# Patient Record
Sex: FEMALE | Race: WHITE | NOT HISPANIC OR LATINO | Employment: FULL TIME | URBAN - METROPOLITAN AREA
[De-identification: names, ages, dates, MRNs, and addresses within clinical notes are randomized per-mention and may not be internally consistent; named-entity substitution may affect disease eponyms.]

---

## 2023-06-09 ENCOUNTER — TELEPHONE (OUTPATIENT)
Dept: PRIMARY CARE | Facility: CLINIC | Age: 53
End: 2023-06-09
Payer: COMMERCIAL

## 2023-06-09 DIAGNOSIS — Z12.31 SCREENING MAMMOGRAM, ENCOUNTER FOR: Primary | ICD-10-CM

## 2023-07-11 DIAGNOSIS — K21.9 GASTROESOPHAGEAL REFLUX DISEASE, UNSPECIFIED WHETHER ESOPHAGITIS PRESENT: ICD-10-CM

## 2023-07-11 RX ORDER — PANTOPRAZOLE SODIUM 40 MG/1
40 TABLET, DELAYED RELEASE ORAL DAILY
Qty: 90 TABLET | Refills: 0 | Status: SHIPPED | OUTPATIENT
Start: 2023-07-11 | End: 2023-12-01 | Stop reason: SDUPTHER

## 2023-07-11 RX ORDER — PANTOPRAZOLE SODIUM 40 MG/1
1 TABLET, DELAYED RELEASE ORAL DAILY
COMMUNITY
End: 2023-07-11 | Stop reason: SDUPTHER

## 2023-08-02 ENCOUNTER — OFFICE VISIT (OUTPATIENT)
Dept: PRIMARY CARE | Facility: CLINIC | Age: 53
End: 2023-08-02
Payer: COMMERCIAL

## 2023-08-02 VITALS
TEMPERATURE: 97.5 F | RESPIRATION RATE: 16 BRPM | HEIGHT: 67 IN | WEIGHT: 230.6 LBS | HEART RATE: 76 BPM | BODY MASS INDEX: 36.19 KG/M2 | SYSTOLIC BLOOD PRESSURE: 128 MMHG | DIASTOLIC BLOOD PRESSURE: 76 MMHG

## 2023-08-02 DIAGNOSIS — L30.9 DERMATITIS: ICD-10-CM

## 2023-08-02 DIAGNOSIS — Z13.220 LIPID SCREENING: ICD-10-CM

## 2023-08-02 DIAGNOSIS — Z00.00 ROUTINE GENERAL MEDICAL EXAMINATION AT A HEALTH CARE FACILITY: Primary | ICD-10-CM

## 2023-08-02 DIAGNOSIS — R73.9 HYPERGLYCEMIA: ICD-10-CM

## 2023-08-02 PROBLEM — D25.9 FIBROID, UTERINE: Status: ACTIVE | Noted: 2023-08-02

## 2023-08-02 PROBLEM — A04.8 H. PYLORI INFECTION: Status: ACTIVE | Noted: 2023-08-02

## 2023-08-02 PROBLEM — J45.909 ASTHMA (HHS-HCC): Status: ACTIVE | Noted: 2023-08-02

## 2023-08-02 PROBLEM — K21.9 GERD (GASTROESOPHAGEAL REFLUX DISEASE): Status: ACTIVE | Noted: 2023-08-02

## 2023-08-02 PROCEDURE — 99396 PREV VISIT EST AGE 40-64: CPT | Performed by: FAMILY MEDICINE

## 2023-08-02 PROCEDURE — 1036F TOBACCO NON-USER: CPT | Performed by: FAMILY MEDICINE

## 2023-08-02 RX ORDER — CLOTRIMAZOLE AND BETAMETHASONE DIPROPIONATE 10; .64 MG/G; MG/G
1 CREAM TOPICAL 2 TIMES DAILY
Qty: 45 G | Refills: 2 | Status: SHIPPED | OUTPATIENT
Start: 2023-08-02

## 2023-08-02 NOTE — PROGRESS NOTES
"Subjective   Patient ID: Glory Winters is a 52 y.o. female who presents for Annual Exam.    Well Adult Physical   Patient here for a comprehensive physical exam.The patient reports problems - last menses 4/7/2023    Do you take any herbs or supplements that were not prescribed by a doctor? yes   Are you taking calcium supplements? no   Are you taking aspirin daily? no     History:  LMP: April 2023  Last pap date               Review of Systems    Objective   /76   Pulse 76   Temp 36.4 °C (97.5 °F)   Resp 16   Ht 1.689 m (5' 6.5\")   Wt 105 kg (230 lb 9.6 oz)   BMI 36.66 kg/m²     Physical Exam  Vitals and nursing note reviewed.   Constitutional:       Appearance: Normal appearance.   HENT:      Head: Normocephalic and atraumatic.      Right Ear: Tympanic membrane, ear canal and external ear normal.      Left Ear: Tympanic membrane, ear canal and external ear normal.      Nose: Nose normal.      Mouth/Throat:      Mouth: Mucous membranes are moist.   Eyes:      Conjunctiva/sclera: Conjunctivae normal.   Neck:      Thyroid: No thyroid mass or thyromegaly.   Cardiovascular:      Rate and Rhythm: Normal rate and regular rhythm.      Pulses: Normal pulses.      Heart sounds: Normal heart sounds.   Pulmonary:      Effort: Pulmonary effort is normal.      Breath sounds: Normal breath sounds. No wheezing.   Abdominal:      General: Abdomen is flat. Bowel sounds are normal.      Palpations: Abdomen is soft. There is no mass.   Musculoskeletal:         General: Normal range of motion.      Cervical back: Neck supple.   Skin:     General: Skin is warm and dry.      Findings: No rash.   Neurological:      General: No focal deficit present.      Mental Status: She is alert and oriented to person, place, and time.   Psychiatric:         Mood and Affect: Mood normal.         Behavior: Behavior normal.         Thought Content: Thought content normal.         Judgment: Judgment normal.         Assessment/Plan "   Problem List Items Addressed This Visit    None  Visit Diagnoses       Routine general medical examination at a health care facility    -  Primary    Hyperglycemia        Relevant Orders    CBC    Comprehensive Metabolic Panel    Hemoglobin A1C    Lipid screening        Relevant Orders    Lipid Panel    Dermatitis        Relevant Medications    clotrimazole-betamethasone (Lotrisone) cream

## 2023-12-01 DIAGNOSIS — K21.9 GASTROESOPHAGEAL REFLUX DISEASE, UNSPECIFIED WHETHER ESOPHAGITIS PRESENT: Primary | ICD-10-CM

## 2023-12-01 RX ORDER — PANTOPRAZOLE SODIUM 40 MG/1
40 TABLET, DELAYED RELEASE ORAL DAILY
Qty: 90 TABLET | Refills: 3 | Status: SHIPPED | OUTPATIENT
Start: 2023-12-01

## 2023-12-02 NOTE — PROGRESS NOTES
Glory Winters female   1970 53 y.o.   [id number]      Chief Complaint  Bilateral breast pain, history of PASH    History Of Present Illness  Glory Winters is a very pleasant 53 y.o.  female [facility] by Theresa Knox for bilateral breast pain and history of PASH. She has family history of breast cancer in her maternal aunt, age 60. She denies breast surgery. She has a history of right breast core biopsy, benign PASH in 2016. She reports significant bilateral breast pain before she had spotting in 2023, since resolved.      BREAST IMAGIN/15/2023 Bilateral screening mammogram, indicates BI-RADS Category 1.     REPRODUCTIVE HISTORY: menarche age 13, , first birth age 20, did not breastfeed, OCP's x 10 years, perimenopausal, LMP 2023, heterogeneously dense tissue    FAMILY CANCER HISTORY:   Maternal Aunt: Breast cancer, age 60     Surgical History  She has a past surgical history that includes Cholecystectomy (2016); Tonsillectomy (2016); and Tubal ligation (2016).     Social History  She reports that she has quit smoking. Her smoking use included cigarettes. She has never used smokeless tobacco. She reports that she does not currently use alcohol. She reports that she does not use drugs.    Family History  Family History   Problem Relation Name Age of Onset    No Known Problems Mother      No Known Problems Father          Allergies  Estradiol-progesterone, Estrogenic substance, and Losartan    Medications  Current Outpatient Medications   Medication Instructions    clotrimazole-betamethasone (Lotrisone) cream 1 Application, Topical, 2 times daily    meloxicam (MOBIC) 15 mg, oral, Daily    pantoprazole (PROTONIX) 40 mg, oral, Daily         REVIEW OF SYSTEMS    Constitutional:  Negative for appetite change, fatigue, fever and unexpected weight change.   HENT:  Negative for ear pain, hearing loss, nosebleeds, sore throat and trouble  swallowing.    Eyes:  Negative for discharge, itching and visual disturbance.   Respiratory:  Negative for cough, chest tightness and shortness of breath.    Cardiovascular:  Negative for chest pain, palpitations and leg swelling.   Breast: as indicated in HPI  Gastrointestinal:  Negative for abdominal pain, constipation, diarrhea and nausea.   Endocrine: Negative for cold intolerance and heat intolerance.   Genitourinary:  Negative for dysuria, frequency, hematuria, pelvic pain and vaginal bleeding.   Musculoskeletal:  Negative for arthralgias, back pain, gait problem, joint swelling and myalgias.   Skin:  Negative for color change and rash.   Allergic/Immunologic: Negative for environmental allergies and food allergies.   Neurological:  Negative for dizziness, tremors, speech difficulty, weakness, numbness and headaches.   Hematological:  Does not bruise/bleed easily.   Psychiatric/Behavioral:  Negative for agitation, dysphoric mood and sleep disturbance. The patient is not nervous/anxious.         Past Medical History  She has a past medical history of Personal history of diseases of the blood and blood-forming organs and certain disorders involving the immune mechanism (01/08/2016).     Physical Exam  Patient is alert and oriented x3 and in a relaxed and appropriate mood. Her gait is steady and hand grasps are equal. Sclera is clear. The breasts are nearly symmetrical. The tissue is soft without palpable abnormalities, discrete nodules or masses. The skin and nipples appear normal. There is no cervical, supraclavicular or axillary lymphadenopathy. Heart rate and rhythm normal, S1 and S2 appreciated. The lungs are clear to auscultation bilaterally. Abdomen is soft and non-tender.       Physical Exam     Last Recorded Vitals  Vitals:    12/06/23 1501   BP: 140/86   Pulse: 80   Resp: 16       Relevant Results   Time was spent discussing digital images of the radiology testing with the patient. I explained the  results in depth, along with suggested explanation for follow up recommendations based on the testing results. BI-RADS Category 1    Imaging  BI mammo bilateral screening tomosynthesis 06/15/2023    Narrative  Interpreted By:  SANTHOSH LUONG MD  MRN: [id number]  Patient Name: DALLIN SANCHEZ    STUDY:  DIGITAL MAMM SCREENING W/ JESS;  6/15/2023 3:39 pm    ACCESSION NUMBER(S):  [id number]    ORDERING CLINICIAN:  ANIKET MOSS    INDICATION:  Screening.    COMPARISON:  03/12/2021, 05/10/2022    FINDINGS:  2D and tomosynthesis images were reviewed at 1 mm slice thickness.    The breast tissue is heterogeneously dense, which may obscure small  masses.  No significant change from prior studies. No suspicious  masses or calcifications are identified.    Impression  No mammographic evidence of malignancy.    BI-RADS CATEGORY:    Category: 1 - Negative.  Recommendation: 1 Year Screening.    For any future breast imaging appointments, please call [phone] ([phone]).    Patient letter sent SNORM       Assessment/Plan   Normal clinical exam, history of right breast biopsy, benign PASH, bilateral breast pain, no family history of breast cancer, heterogeneously dense tissue    Plan: Return in June 2024 for bilateral screening mammogram and office visit. Patient prefers to follow with Breast Specialist. Breast pain education provided. Hot flashes education provided.     Patient Discussion/Summary  Your clinical examination is normal. Please return in June 2024 for bilateral screening mammogram and office visit or sooner if you have any problems or concerns.     You can see your health information, review clinical summaries from office visits & test results online when you follow your health with MY  Chart, a personal health record. To sign up go to [email or web address]. If you need assistance with signing up or trouble getting into your account call [facility] Patient Line 24/7 at [phone].    My office phone number is  [phone] if you need to get in touch with me or have additional questions or concerns. Thank you for choosing [facility] and trusting me as your healthcare provider. I look forward to seeing you again at your next office visit. I am honored to be a provider on your health care team and I remain dedicated to helping you achieve your health goals.    Renu Sams, APRN-CNP

## 2023-12-06 ENCOUNTER — OFFICE VISIT (OUTPATIENT)
Dept: SURGICAL ONCOLOGY | Facility: HOSPITAL | Age: 53
End: 2023-12-06
Payer: COMMERCIAL

## 2023-12-06 VITALS
SYSTOLIC BLOOD PRESSURE: 140 MMHG | RESPIRATION RATE: 16 BRPM | BODY MASS INDEX: 34.1 KG/M2 | WEIGHT: 225 LBS | DIASTOLIC BLOOD PRESSURE: 86 MMHG | HEART RATE: 80 BPM | HEIGHT: 68 IN

## 2023-12-06 DIAGNOSIS — Z12.31 ENCOUNTER FOR SCREENING MAMMOGRAM FOR MALIGNANT NEOPLASM OF BREAST: Primary | ICD-10-CM

## 2023-12-06 DIAGNOSIS — Z00.00 NORMAL BREAST EXAM: ICD-10-CM

## 2023-12-06 DIAGNOSIS — N64.89 PSEUDOANGIOMATOUS STROMAL HYPERPLASIA OF BREAST: ICD-10-CM

## 2023-12-06 PROCEDURE — 1036F TOBACCO NON-USER: CPT | Performed by: NURSE PRACTITIONER

## 2023-12-06 PROCEDURE — 99213 OFFICE O/P EST LOW 20 MIN: CPT | Performed by: NURSE PRACTITIONER

## 2023-12-06 PROCEDURE — 99203 OFFICE O/P NEW LOW 30 MIN: CPT | Performed by: NURSE PRACTITIONER

## 2023-12-06 RX ORDER — MELOXICAM 15 MG/1
15 TABLET ORAL DAILY
COMMUNITY

## 2023-12-06 NOTE — PATIENT INSTRUCTIONS
Your clinical examination is normal. Please return in June 2024 for bilateral screening mammogram and office visit or sooner if you have any problems or concerns.     You can see your health information, review clinical summaries from office visits & test results online when you follow your health with MY  Chart, a personal health record. To sign up go to [email or web address]. If you need assistance with signing up or trouble getting into your account call [employer] Patient Line 24/7 at [phone].    My office phone number is [phone] if you need to get in touch with me or have additional questions or concerns. Thank you for choosing [facility] and trusting me as your healthcare provider. I look forward to seeing you again at your next office visit. I am honored to be a provider on your health care team and I remain dedicated to helping you achieve your health goals.

## 2024-02-20 ENCOUNTER — OFFICE VISIT (OUTPATIENT)
Dept: PRIMARY CARE | Facility: CLINIC | Age: 54
End: 2024-02-20
Payer: COMMERCIAL

## 2024-02-20 VITALS
DIASTOLIC BLOOD PRESSURE: 82 MMHG | SYSTOLIC BLOOD PRESSURE: 128 MMHG | OXYGEN SATURATION: 97 % | WEIGHT: 223 LBS | RESPIRATION RATE: 18 BRPM | BODY MASS INDEX: 34.41 KG/M2 | TEMPERATURE: 97.9 F | HEART RATE: 84 BPM

## 2024-02-20 DIAGNOSIS — B37.9 ANTIBIOTIC-INDUCED YEAST INFECTION: ICD-10-CM

## 2024-02-20 DIAGNOSIS — T36.95XA ANTIBIOTIC-INDUCED YEAST INFECTION: ICD-10-CM

## 2024-02-20 DIAGNOSIS — R39.9 URINARY TRACT INFECTION SYMPTOMS: Primary | ICD-10-CM

## 2024-02-20 LAB
POC APPEARANCE, URINE: CLEAR
POC BILIRUBIN, URINE: NEGATIVE
POC BLOOD, URINE: ABNORMAL
POC COLOR, URINE: YELLOW
POC GLUCOSE, URINE: NEGATIVE MG/DL
POC KETONES, URINE: NEGATIVE MG/DL
POC LEUKOCYTES, URINE: ABNORMAL
POC NITRITE,URINE: NEGATIVE
POC PH, URINE: 6.5 PH
POC PROTEIN, URINE: NEGATIVE MG/DL
POC SPECIFIC GRAVITY, URINE: 1.02
POC UROBILINOGEN, URINE: 0.2 EU/DL

## 2024-02-20 PROCEDURE — 99214 OFFICE O/P EST MOD 30 MIN: CPT | Performed by: NURSE PRACTITIONER

## 2024-02-20 PROCEDURE — 1036F TOBACCO NON-USER: CPT | Performed by: NURSE PRACTITIONER

## 2024-02-20 PROCEDURE — 81002 URINALYSIS NONAUTO W/O SCOPE: CPT | Performed by: NURSE PRACTITIONER

## 2024-02-20 PROCEDURE — 87086 URINE CULTURE/COLONY COUNT: CPT

## 2024-02-20 RX ORDER — FLUCONAZOLE 150 MG/1
TABLET ORAL
Qty: 1 TABLET | Refills: 1 | Status: SHIPPED | OUTPATIENT
Start: 2024-02-20

## 2024-02-20 RX ORDER — NITROFURANTOIN 25; 75 MG/1; MG/1
100 CAPSULE ORAL 2 TIMES DAILY
Qty: 10 CAPSULE | Refills: 0 | Status: SHIPPED | OUTPATIENT
Start: 2024-02-20 | End: 2024-02-25

## 2024-02-20 ASSESSMENT — ENCOUNTER SYMPTOMS
NAUSEA: 1
VOMITING: 0
HEMATURIA: 1
FEVER: 0
DYSURIA: 1
CHILLS: 0
CARDIOVASCULAR NEGATIVE: 1
RESPIRATORY NEGATIVE: 1
ABDOMINAL PAIN: 0
FATIGUE: 0
FREQUENCY: 1

## 2024-02-20 NOTE — PROGRESS NOTES
Subjective   Patient ID: Glory Winters is a 53 y.o. female who presents for UTI.    Symptoms started on Saturday morning with back pain. This morning, patient has urgency, bladder pressure, blood when wiping. Pt has urinary frequency. No fevers, abdominal pain, vomiting. Pt has some nausea. No chance of STIs.     Review of Systems   Constitutional:  Negative for chills, fatigue and fever.   HENT: Negative.     Respiratory: Negative.     Cardiovascular: Negative.    Gastrointestinal:  Positive for nausea. Negative for abdominal pain and vomiting.   Genitourinary:  Positive for dysuria, frequency, hematuria and urgency.     Objective   Resp 18   Wt 101 kg (223 lb)   BMI 34.41 kg/m²     Physical Exam  Vitals reviewed.   Constitutional:       General: She is not in acute distress.     Appearance: Normal appearance. She is not ill-appearing or toxic-appearing.   HENT:      Head: Atraumatic.   Eyes:      Conjunctiva/sclera: Conjunctivae normal.   Cardiovascular:      Rate and Rhythm: Normal rate and regular rhythm.      Heart sounds: Normal heart sounds. No murmur heard.  Pulmonary:      Effort: Pulmonary effort is normal.      Breath sounds: Normal breath sounds. No wheezing or rhonchi.   Abdominal:      General: Bowel sounds are normal.      Palpations: Abdomen is soft.      Tenderness: There is no right CVA tenderness, left CVA tenderness, guarding or rebound.   Skin:     General: Skin is warm and dry.   Neurological:      General: No focal deficit present.      Mental Status: She is alert.   Psychiatric:         Mood and Affect: Mood normal.         Behavior: Behavior normal.     Assessment/Plan   Problem List Items Addressed This Visit    None  Visit Diagnoses         Codes    Urinary tract infection symptoms    -  Primary R39.9    Relevant Medications    nitrofurantoin, macrocrystal-monohydrate, (Macrobid) 100 mg capsule    Other Relevant Orders    POCT UA (nonautomated) manually resulted (Completed)    Urine  Culture    Antibiotic-induced yeast infection     B37.9, T36.95XA    Relevant Medications    fluconazole (Diflucan) 150 mg tablet        Patient has not had her blood work done as ordered by Dr. Knox in August. Pt says that she will get blood work done tomorrow.     UA indicative of a UTI. will start patient on macrobid at this time. will send urine out for culture. Discussed possible CT imaging today for possible kidney stones. Pt declines the need for a CT and would like to start on antibiotics. patient advised to call the office if symptoms persist in the next 2-3 days despite the use of the medication. patient advised to go to the ER for any fevers, chills, abdominal pain, nausea, vomiting or new/concerning symptoms; she agreed. will call pt when urine culture results become available.

## 2024-02-21 ENCOUNTER — LAB (OUTPATIENT)
Dept: LAB | Facility: LAB | Age: 54
End: 2024-02-21
Payer: COMMERCIAL

## 2024-02-21 ENCOUNTER — TELEPHONE (OUTPATIENT)
Dept: PRIMARY CARE | Facility: CLINIC | Age: 54
End: 2024-02-21

## 2024-02-21 DIAGNOSIS — Z13.220 LIPID SCREENING: ICD-10-CM

## 2024-02-21 DIAGNOSIS — R73.9 HYPERGLYCEMIA: ICD-10-CM

## 2024-02-21 LAB
ALBUMIN SERPL BCP-MCNC: 4.5 G/DL (ref 3.4–5)
ALP SERPL-CCNC: 40 U/L (ref 33–110)
ALT SERPL W P-5'-P-CCNC: 13 U/L (ref 7–45)
ANION GAP SERPL CALC-SCNC: 13 MMOL/L (ref 10–20)
AST SERPL W P-5'-P-CCNC: 16 U/L (ref 9–39)
BILIRUB SERPL-MCNC: 0.9 MG/DL (ref 0–1.2)
BUN SERPL-MCNC: 17 MG/DL (ref 6–23)
CALCIUM SERPL-MCNC: 9.7 MG/DL (ref 8.6–10.3)
CHLORIDE SERPL-SCNC: 103 MMOL/L (ref 98–107)
CHOLEST SERPL-MCNC: 162 MG/DL (ref 0–199)
CHOLESTEROL/HDL RATIO: 3.8
CO2 SERPL-SCNC: 28 MMOL/L (ref 21–32)
CREAT SERPL-MCNC: 0.87 MG/DL (ref 0.5–1.05)
EGFRCR SERPLBLD CKD-EPI 2021: 80 ML/MIN/1.73M*2
ERYTHROCYTE [DISTWIDTH] IN BLOOD BY AUTOMATED COUNT: 12.3 % (ref 11.5–14.5)
EST. AVERAGE GLUCOSE BLD GHB EST-MCNC: 94 MG/DL
GLUCOSE SERPL-MCNC: 94 MG/DL (ref 74–99)
HBA1C MFR BLD: 4.9 %
HCT VFR BLD AUTO: 45.7 % (ref 36–46)
HDLC SERPL-MCNC: 42.4 MG/DL
HGB BLD-MCNC: 15.7 G/DL (ref 12–16)
LDLC SERPL CALC-MCNC: 105 MG/DL
MCH RBC QN AUTO: 30.4 PG (ref 26–34)
MCHC RBC AUTO-ENTMCNC: 34.4 G/DL (ref 32–36)
MCV RBC AUTO: 89 FL (ref 80–100)
NON HDL CHOLESTEROL: 120 MG/DL (ref 0–149)
NRBC BLD-RTO: 0 /100 WBCS (ref 0–0)
PLATELET # BLD AUTO: 172 X10*3/UL (ref 150–450)
POTASSIUM SERPL-SCNC: 4.4 MMOL/L (ref 3.5–5.3)
PROT SERPL-MCNC: 7.1 G/DL (ref 6.4–8.2)
RBC # BLD AUTO: 5.16 X10*6/UL (ref 4–5.2)
SODIUM SERPL-SCNC: 140 MMOL/L (ref 136–145)
TRIGL SERPL-MCNC: 72 MG/DL (ref 0–149)
VLDL: 14 MG/DL (ref 0–40)
WBC # BLD AUTO: 5 X10*3/UL (ref 4.4–11.3)

## 2024-02-21 PROCEDURE — 36415 COLL VENOUS BLD VENIPUNCTURE: CPT

## 2024-02-21 PROCEDURE — 80061 LIPID PANEL: CPT

## 2024-02-21 PROCEDURE — 83036 HEMOGLOBIN GLYCOSYLATED A1C: CPT

## 2024-02-21 PROCEDURE — 80053 COMPREHEN METABOLIC PANEL: CPT

## 2024-02-21 PROCEDURE — 85027 COMPLETE CBC AUTOMATED: CPT

## 2024-02-21 NOTE — TELEPHONE ENCOUNTER
Spoke to patient and discussed with her that her CBC and CMP are normal. Patient has started on the antibiotics and still has some back pain. Pt declined the need for CT imaging at this time and will continue on with the antibiotics. Will follow up when results of culture become available.     ----- Message from Nida Romano sent at 2/21/2024  3:03 PM EST -----  Regarding: Call back  Patient was seen yesterday for a UTI.  Got blood work done today and was wondering about the results.  Would like a call back.

## 2024-02-22 ENCOUNTER — TELEPHONE (OUTPATIENT)
Dept: PRIMARY CARE | Facility: CLINIC | Age: 54
End: 2024-02-22
Payer: COMMERCIAL

## 2024-02-22 ENCOUNTER — HOSPITAL ENCOUNTER (OUTPATIENT)
Dept: RADIOLOGY | Facility: CLINIC | Age: 54
Discharge: HOME | End: 2024-02-22
Payer: COMMERCIAL

## 2024-02-22 DIAGNOSIS — R10.9 BILATERAL FLANK PAIN: ICD-10-CM

## 2024-02-22 DIAGNOSIS — R10.9 BILATERAL FLANK PAIN: Primary | ICD-10-CM

## 2024-02-22 DIAGNOSIS — N20.0 RIGHT NEPHROLITHIASIS: Primary | ICD-10-CM

## 2024-02-22 DIAGNOSIS — R31.9 HEMATURIA OF UNKNOWN CAUSE: ICD-10-CM

## 2024-02-22 LAB — BACTERIA UR CULT: NORMAL

## 2024-02-22 PROCEDURE — 74176 CT ABD & PELVIS W/O CONTRAST: CPT | Performed by: RADIOLOGY

## 2024-02-22 PROCEDURE — 74176 CT ABD & PELVIS W/O CONTRAST: CPT

## 2024-02-22 RX ORDER — TAMSULOSIN HYDROCHLORIDE 0.4 MG/1
0.4 CAPSULE ORAL DAILY
Qty: 7 CAPSULE | Refills: 0 | Status: SHIPPED | OUTPATIENT
Start: 2024-02-22 | End: 2024-02-29

## 2024-02-22 NOTE — TELEPHONE ENCOUNTER
Spoke to patient and relayed negative urine culture results. Patient is having a lot of lower back pain and abdominal pressure. Will order CT scan to have done STAT.  staff to help patient schedule.

## 2024-02-23 NOTE — TELEPHONE ENCOUNTER
uroMauricio to patient and relayed results of CT scan showing 3 mm kidney stone. Patient is having cramping so will prescribe tamsulosin for pt to take. Pt to continue to strain urine. Will refer pt to urology as well. Advised ER for any worsening pain, abdominal pain or new/concerning symptoms; she agreed.

## 2024-03-26 ENCOUNTER — APPOINTMENT (OUTPATIENT)
Dept: UROLOGY | Facility: CLINIC | Age: 54
End: 2024-03-26
Payer: COMMERCIAL

## 2024-06-05 NOTE — PROGRESS NOTES
Glory Winters female   1970 53 y.o.   [id number]      Chief Complaint  Annual mammogram and exam, history of PASH    History Of Present Illness  Glory Winters is a very pleasant 53 y.o.  female [facility] for annual mammogram and exam. She has family history of breast cancer in her maternal aunt, age 60. She denies breast surgery. She has a history of right breast core biopsy, benign PASH in 2016. She had significant bilateral breast pain before she had spotting in 2023, since resolved. She presents today for annual mammogram and exam. She denies any new masses or lumps.      BREAST IMAGIN/15/2023 Bilateral screening mammogram, indicates BI-RADS Category 1.     REPRODUCTIVE HISTORY: menarche age 13, , first birth age 20, did not breastfeed, OCP's x 10 years, perimenopausal, LMP 2023, heterogeneously dense tissue    FAMILY CANCER HISTORY:   Maternal Aunt: Breast cancer, age 60     Surgical History  She has a past surgical history that includes Cholecystectomy (2016); Tonsillectomy (2016); and Tubal ligation (2016).     Social History  She reports that she has quit smoking. Her smoking use included cigarettes. She has never used smokeless tobacco. She reports that she does not currently use alcohol. She reports that she does not use drugs.    Family History  Family History   Problem Relation Name Age of Onset    No Known Problems Mother      No Known Problems Father          Allergies  Estradiol-progesterone, Estrogenic substance, and Losartan    Medications  Current Outpatient Medications   Medication Instructions    clotrimazole-betamethasone (Lotrisone) cream 1 Application, Topical, 2 times daily    fluconazole (Diflucan) 150 mg tablet Please take 1 tablet when symptoms start. May repeat dose after 72 hours if symptoms persist    pantoprazole (PROTONIX) 40 mg, oral, Daily         REVIEW OF SYSTEMS    Constitutional:  Negative  for appetite change, fatigue, fever and unexpected weight change.   HENT:  Negative for ear pain, hearing loss, nosebleeds, sore throat and trouble swallowing.    Eyes:  Negative for discharge, itching and visual disturbance.   Respiratory:  Negative for cough, chest tightness and shortness of breath.    Cardiovascular:  Negative for chest pain, palpitations and leg swelling.   Breast: as indicated in HPI  Gastrointestinal:  Negative for abdominal pain, constipation, diarrhea and nausea.   Endocrine: Negative for cold intolerance and heat intolerance.   Genitourinary:  Negative for dysuria, frequency, hematuria, pelvic pain and vaginal bleeding.   Musculoskeletal:  Negative for arthralgias, back pain, gait problem, joint swelling and myalgias.   Skin:  Negative for color change and rash.   Allergic/Immunologic: Negative for environmental allergies and food allergies.   Neurological:  Negative for dizziness, tremors, speech difficulty, weakness, numbness and headaches.   Hematological:  Does not bruise/bleed easily.   Psychiatric/Behavioral:  Negative for agitation, dysphoric mood and sleep disturbance. The patient is not nervous/anxious.         Past Medical History  She has a past medical history of Personal history of diseases of the blood and blood-forming organs and certain disorders involving the immune mechanism (01/08/2016).     Physical Exam  Patient is alert and oriented x3 and in a relaxed and appropriate mood. The right arm has slight limited range of motion due to shoulder injury. Her gait is steady and hand grasps are equal. Sclera is clear. The breasts are nearly symmetrical. The tissue is soft without palpable abnormalities, discrete nodules or masses. The skin and nipples appear normal. There is no cervical, supraclavicular or axillary lymphadenopathy. Heart rate and rhythm normal, S1 and S2 appreciated. The lungs are clear to auscultation bilaterally. Abdomen is soft and non-tender.       Physical  Exam     Last Recorded Vitals  Vitals:    06/18/24 1404   BP: 122/78   Pulse: 80   Resp: 16         Relevant Results   Time was spent discussing digital images of the radiology testing with the patient. I explained the results in depth, along with suggested explanation for follow up recommendations based on the testing results. BI-RADS Category 1    Imaging     Narrative & Impression   Interpreted By:  Jory Brown,   STUDY:  BI MAMMO BILATERAL SCREENING TOMOSYNTHESIS;  6/18/2024 2:52 pm      ACCESSION NUMBER(S):  [id number]      ORDERING CLINICIAN:  MYLENE NIX      INDICATION:  Screening. Benign right breast biopsy with results of PASH      COMPARISON:  06/15/2023, 05/10/2022, 03/12/2021, 03/06/2020      FINDINGS:  2D and tomosynthesis images were reviewed at 1 mm slice thickness.      Density:  The breast tissue is heterogeneously dense, which may  obscure small masses.      Tissue marker in the upper outer right breast at posterior depth. No  suspicious masses or calcifications are identified.      IMPRESSION:  No mammographic evidence of malignancy.      BI-RADS CATEGORY:  BI-RADS Category:  1 Negative.  Recommendation:  Annual Screening.  Recommended Date:  1 Year.  Laterality:  Bilateral.           Assessment/Plan   Normal clinical exam and imaging, history of right breast biopsy, benign PASH, bilateral breast pain, resolved, no family history of breast cancer, heterogeneously dense tissue    Plan: Return in one year for bilateral screening mammogram and office visit. Patient prefers to follow with Breast Specialist.     Patient Discussion/Summary  Your clinical examination and imaging are normal. Please return in one year for bilateral screening mammogram and office visit or sooner if you have any problems or concerns.     You can see your health information, review clinical summaries from office visits & test results online when you follow your health with MY  Chart, a personal health record. To  sign up go to [email or web address]. If you need assistance with signing up or trouble getting into your account call [employer] Patient Line 24/7 at [phone].    My office phone number is [phone] if you need to get in touch with me or have additional questions or concerns. Thank you for choosing [facility] and trusting me as your healthcare provider. I look forward to seeing you again at your next office visit. I am honored to be a provider on your health care team and I remain dedicated to helping you achieve your health goals.      Renu Sams, APRN-CNP

## 2024-06-18 ENCOUNTER — OFFICE VISIT (OUTPATIENT)
Dept: SURGICAL ONCOLOGY | Facility: HOSPITAL | Age: 54
End: 2024-06-18
Payer: COMMERCIAL

## 2024-06-18 ENCOUNTER — HOSPITAL ENCOUNTER (OUTPATIENT)
Dept: RADIOLOGY | Facility: HOSPITAL | Age: 54
Discharge: HOME | End: 2024-06-18
Payer: COMMERCIAL

## 2024-06-18 VITALS
RESPIRATION RATE: 16 BRPM | DIASTOLIC BLOOD PRESSURE: 78 MMHG | HEIGHT: 68 IN | HEART RATE: 80 BPM | WEIGHT: 215 LBS | SYSTOLIC BLOOD PRESSURE: 122 MMHG | BODY MASS INDEX: 32.58 KG/M2

## 2024-06-18 DIAGNOSIS — Z12.39 BREAST CANCER SCREENING, HIGH RISK PATIENT: Primary | ICD-10-CM

## 2024-06-18 DIAGNOSIS — Z12.31 SCREENING MAMMOGRAM FOR HIGH-RISK PATIENT: ICD-10-CM

## 2024-06-18 DIAGNOSIS — R92.30 DENSE BREAST TISSUE: ICD-10-CM

## 2024-06-18 PROCEDURE — 99213 OFFICE O/P EST LOW 20 MIN: CPT | Performed by: NURSE PRACTITIONER

## 2024-06-18 PROCEDURE — 77063 BREAST TOMOSYNTHESIS BI: CPT | Mod: BILATERAL PROCEDURE | Performed by: RADIOLOGY

## 2024-06-18 PROCEDURE — 77067 SCR MAMMO BI INCL CAD: CPT | Mod: BILATERAL PROCEDURE | Performed by: RADIOLOGY

## 2024-06-18 PROCEDURE — 1036F TOBACCO NON-USER: CPT | Performed by: NURSE PRACTITIONER

## 2024-06-18 PROCEDURE — 77067 SCR MAMMO BI INCL CAD: CPT

## 2024-06-18 NOTE — PATIENT INSTRUCTIONS
Your clinical examination and imaging are normal. Please return in one year for bilateral screening mammogram and office visit or sooner if you have any problems or concerns.     You can see your health information, review clinical summaries from office visits & test results online when you follow your health with MY  Chart, a personal health record. To sign up go to [email or web address]. If you need assistance with signing up or trouble getting into your account call [employer] Patient Line 24/7 at [phone].    My office phone number is [phone] if you need to get in touch with me or have additional questions or concerns. Thank you for choosing [facility] and trusting me as your healthcare provider. I look forward to seeing you again at your next office visit. I am honored to be a provider on your health care team and I remain dedicated to helping you achieve your health goals.

## 2025-05-22 DIAGNOSIS — K21.9 GASTROESOPHAGEAL REFLUX DISEASE, UNSPECIFIED WHETHER ESOPHAGITIS PRESENT: ICD-10-CM

## 2025-05-22 RX ORDER — PANTOPRAZOLE SODIUM 40 MG/1
40 TABLET, DELAYED RELEASE ORAL DAILY
Qty: 90 TABLET | Refills: 3 | Status: SHIPPED | OUTPATIENT
Start: 2025-05-22

## 2025-07-01 ENCOUNTER — APPOINTMENT (OUTPATIENT)
Dept: PRIMARY CARE | Facility: CLINIC | Age: 55
End: 2025-07-01
Payer: COMMERCIAL

## 2025-07-01 VITALS
OXYGEN SATURATION: 98 % | RESPIRATION RATE: 18 BRPM | WEIGHT: 226.8 LBS | TEMPERATURE: 97.3 F | SYSTOLIC BLOOD PRESSURE: 120 MMHG | DIASTOLIC BLOOD PRESSURE: 68 MMHG | BODY MASS INDEX: 34.37 KG/M2 | HEART RATE: 74 BPM | HEIGHT: 68 IN

## 2025-07-01 DIAGNOSIS — Z12.31 ENCOUNTER FOR SCREENING MAMMOGRAM FOR MALIGNANT NEOPLASM OF BREAST: ICD-10-CM

## 2025-07-01 DIAGNOSIS — Z00.00 ROUTINE GENERAL MEDICAL EXAMINATION AT A HEALTH CARE FACILITY: Primary | ICD-10-CM

## 2025-07-01 DIAGNOSIS — Z13.220 LIPID SCREENING: ICD-10-CM

## 2025-07-01 DIAGNOSIS — E66.09 CLASS 1 OBESITY DUE TO EXCESS CALORIES WITHOUT SERIOUS COMORBIDITY WITH BODY MASS INDEX (BMI) OF 34.0 TO 34.9 IN ADULT: ICD-10-CM

## 2025-07-01 DIAGNOSIS — E11.9 TYPE 2 DIABETES MELLITUS WITHOUT COMPLICATION, WITHOUT LONG-TERM CURRENT USE OF INSULIN: ICD-10-CM

## 2025-07-01 DIAGNOSIS — R53.83 OTHER FATIGUE: ICD-10-CM

## 2025-07-01 DIAGNOSIS — M19.011 PRIMARY OSTEOARTHRITIS OF RIGHT SHOULDER: ICD-10-CM

## 2025-07-01 DIAGNOSIS — L30.9 DERMATITIS: ICD-10-CM

## 2025-07-01 DIAGNOSIS — J45.20 MILD INTERMITTENT ASTHMA WITHOUT COMPLICATION (HHS-HCC): ICD-10-CM

## 2025-07-01 DIAGNOSIS — E55.9 VITAMIN D DEFICIENCY: ICD-10-CM

## 2025-07-01 DIAGNOSIS — Z92.89 HISTORY OF POSITIVE PPD: ICD-10-CM

## 2025-07-01 DIAGNOSIS — E66.811 CLASS 1 OBESITY DUE TO EXCESS CALORIES WITHOUT SERIOUS COMORBIDITY WITH BODY MASS INDEX (BMI) OF 34.0 TO 34.9 IN ADULT: ICD-10-CM

## 2025-07-01 DIAGNOSIS — R73.9 HYPERGLYCEMIA: ICD-10-CM

## 2025-07-01 PROBLEM — Z86.2 HISTORY OF ANEMIA: Status: ACTIVE | Noted: 2025-07-01

## 2025-07-01 PROBLEM — F32.A DEPRESSION: Status: ACTIVE | Noted: 2025-07-01

## 2025-07-01 PROBLEM — K64.8 PROLAPSED HEMORRHOIDS: Status: ACTIVE | Noted: 2025-07-01

## 2025-07-01 PROBLEM — E66.9 OBESITY: Status: ACTIVE | Noted: 2025-07-01

## 2025-07-01 PROBLEM — R92.8 ABNORMAL MAMMOGRAM: Status: ACTIVE | Noted: 2025-07-01

## 2025-07-01 PROBLEM — F32.A DEPRESSIVE DISORDER: Status: ACTIVE | Noted: 2025-07-01

## 2025-07-01 PROBLEM — R10.13 EPIGASTRIC PAIN: Status: ACTIVE | Noted: 2025-07-01

## 2025-07-01 PROBLEM — M75.01 ADHESIVE CAPSULITIS OF RIGHT SHOULDER: Status: ACTIVE | Noted: 2023-05-02

## 2025-07-01 PROBLEM — I10 HYPERTENSION: Status: ACTIVE | Noted: 2025-07-01

## 2025-07-01 PROBLEM — D23.9 DERMATOFIBROMA: Status: ACTIVE | Noted: 2025-07-01

## 2025-07-01 PROBLEM — K59.09 CHRONIC CONSTIPATION: Status: ACTIVE | Noted: 2025-07-01

## 2025-07-01 PROBLEM — M25.511 PAIN IN RIGHT SHOULDER: Status: ACTIVE | Noted: 2023-05-19

## 2025-07-01 PROBLEM — Z11.52 ENCOUNTER FOR SCREENING LABORATORY TESTING FOR COVID-19 VIRUS: Status: ACTIVE | Noted: 2025-07-01

## 2025-07-01 PROBLEM — N64.89 PSEUDOANGIOMATOUS STROMAL HYPERPLASIA OF BREAST: Status: ACTIVE | Noted: 2025-07-01

## 2025-07-01 PROBLEM — K59.00 CONSTIPATION: Status: ACTIVE | Noted: 2025-07-01

## 2025-07-01 PROCEDURE — 1036F TOBACCO NON-USER: CPT | Performed by: NURSE PRACTITIONER

## 2025-07-01 PROCEDURE — 3078F DIAST BP <80 MM HG: CPT | Performed by: NURSE PRACTITIONER

## 2025-07-01 PROCEDURE — 3074F SYST BP LT 130 MM HG: CPT | Performed by: NURSE PRACTITIONER

## 2025-07-01 PROCEDURE — 99396 PREV VISIT EST AGE 40-64: CPT | Performed by: NURSE PRACTITIONER

## 2025-07-01 PROCEDURE — 3008F BODY MASS INDEX DOCD: CPT | Performed by: NURSE PRACTITIONER

## 2025-07-01 RX ORDER — CLOTRIMAZOLE AND BETAMETHASONE DIPROPIONATE 10; .64 MG/G; MG/G
1 CREAM TOPICAL 2 TIMES DAILY
Qty: 45 G | Refills: 2 | Status: SHIPPED | OUTPATIENT
Start: 2025-07-01

## 2025-07-01 ASSESSMENT — ENCOUNTER SYMPTOMS
CHILLS: 0
DIARRHEA: 0
NERVOUS/ANXIOUS: 1
SORE THROAT: 0
NECK PAIN: 1
CONSTIPATION: 1
DYSURIA: 0
ROS GI COMMENTS: SOMETIMES
RHINORRHEA: 0
SHORTNESS OF BREATH: 0
SLEEP DISTURBANCE: 1
FEVER: 0
FATIGUE: 0
HEADACHES: 0

## 2025-07-01 ASSESSMENT — PATIENT HEALTH QUESTIONNAIRE - PHQ9
2. FEELING DOWN, DEPRESSED OR HOPELESS: NOT AT ALL
SUM OF ALL RESPONSES TO PHQ9 QUESTIONS 1 AND 2: 0
1. LITTLE INTEREST OR PLEASURE IN DOING THINGS: NOT AT ALL

## 2025-07-01 NOTE — PROGRESS NOTES
Subjective   Glory Winters is a 54 y.o. female who presents for Annual Exam (CPE) and Establish Care (Establish care  transfer from dr Knox ).  CPE  Patient of dr Knox   Colonoscopy 3.2.2022-Q5y  Mammogram- 6.18.2024   Pap- 5.10.2022      Review of Systems   Constitutional:  Negative for chills, fatigue and fever.   HENT:  Negative for rhinorrhea and sore throat.    Respiratory:  Negative for shortness of breath.    Cardiovascular:  Positive for leg swelling. Negative for chest pain.        Sometimes   Chest ache  lately sometimes   Gastrointestinal:  Positive for constipation. Negative for diarrhea.        Sometimes   Genitourinary:  Negative for dysuria.   Musculoskeletal:  Positive for neck pain.   Skin:  Positive for rash.        On the  face and R arm   Neurological:  Negative for headaches.   Psychiatric/Behavioral:  Positive for sleep disturbance. The patient is nervous/anxious.    All other systems reviewed and are negative.      Objective   Physical Exam  Constitutional:       General: She is not in acute distress.     Appearance: She is not ill-appearing.   HENT:      Head: Normocephalic and atraumatic.      Right Ear: Tympanic membrane, ear canal and external ear normal.      Left Ear: Tympanic membrane, ear canal and external ear normal.      Nose: Nose normal.      Mouth/Throat:      Mouth: Mucous membranes are moist.      Pharynx: Oropharynx is clear.   Eyes:      Conjunctiva/sclera: Conjunctivae normal.      Pupils: Pupils are equal, round, and reactive to light.   Cardiovascular:      Rate and Rhythm: Normal rate and regular rhythm.      Pulses: Normal pulses.      Heart sounds: Normal heart sounds.   Pulmonary:      Effort: Pulmonary effort is normal. No respiratory distress.      Breath sounds: Normal breath sounds.   Abdominal:      General: Bowel sounds are normal.      Palpations: Abdomen is soft.      Tenderness: There is no abdominal tenderness.   Musculoskeletal:          "General: Normal range of motion.   Skin:     General: Skin is warm and dry.   Neurological:      General: No focal deficit present.      Mental Status: She is alert and oriented to person, place, and time.   Psychiatric:         Mood and Affect: Mood normal.         Behavior: Behavior normal.         Thought Content: Thought content normal.         Judgment: Judgment normal.       /68 (BP Location: Right arm, Patient Position: Sitting)   Pulse 74   Temp 36.3 °C (97.3 °F)   Resp 18   Ht 1.727 m (5' 8\")   Wt 103 kg (226 lb 12.8 oz)   SpO2 98%   BMI 34.48 kg/m²       Assessment/Plan   Problem List Items Addressed This Visit       Asthma    7.1.25- stable with prn albuterol         Obesity    Relevant Orders    Referral to Clinical Pharmacy    Primary osteoarthritis of right shoulder    Type 2 diabetes mellitus    Relevant Orders    Hemoglobin A1C     Other Visit Diagnoses         Routine general medical examination at a health care facility    -  Primary      Hyperglycemia        Relevant Orders    CBC and Auto Differential    Comprehensive Metabolic Panel    Hemoglobin A1C      Vitamin D deficiency        Relevant Orders    Vitamin D 25-Hydroxy,Total (for eval of Vitamin D levels)      Lipid screening        Relevant Orders    Lipid Panel      Other fatigue        Relevant Orders    TSH with reflex to Free T4 if abnormal      Dermatitis        Relevant Medications    clotrimazole-betamethasone (Lotrisone) cream      History of positive PPD          Encounter for screening mammogram for malignant neoplasm of breast        Relevant Orders    BI mammo bilateral screening tomosynthesis          Patient Instructions   Patient to continue medications as ordered. Have fasting labs drawn, and we will call with results when available. Follow-up in 3-4 months, or sooner if needed. Call the office if any problems or concerns in the meantime.       "

## 2025-07-01 NOTE — PATIENT INSTRUCTIONS
Patient to continue medications as ordered. Have fasting labs drawn, and we will call with results when available. Follow-up in 3-4 months, or sooner if needed. Call the office if any problems or concerns in the meantime.

## 2025-07-02 ENCOUNTER — APPOINTMENT (OUTPATIENT)
Dept: PRIMARY CARE | Facility: CLINIC | Age: 55
End: 2025-07-02
Payer: COMMERCIAL

## 2025-07-06 LAB
25(OH)D3+25(OH)D2 SERPL-MCNC: 32 NG/ML (ref 30–100)
ALBUMIN SERPL-MCNC: 4.1 G/DL (ref 3.6–5.1)
ALP SERPL-CCNC: 50 U/L (ref 37–153)
ALT SERPL-CCNC: 10 U/L (ref 6–29)
ANION GAP SERPL CALCULATED.4IONS-SCNC: 8 MMOL/L (CALC) (ref 7–17)
AST SERPL-CCNC: 13 U/L (ref 10–35)
BASOPHILS # BLD AUTO: 41 CELLS/UL (ref 0–200)
BASOPHILS NFR BLD AUTO: 0.7 %
BILIRUB SERPL-MCNC: 0.5 MG/DL (ref 0.2–1.2)
BUN SERPL-MCNC: 19 MG/DL (ref 7–25)
CALCIUM SERPL-MCNC: 9.1 MG/DL (ref 8.6–10.4)
CHLORIDE SERPL-SCNC: 108 MMOL/L (ref 98–110)
CHOLEST SERPL-MCNC: 166 MG/DL
CHOLEST/HDLC SERPL: 3 (CALC)
CO2 SERPL-SCNC: 26 MMOL/L (ref 20–32)
CREAT SERPL-MCNC: 0.83 MG/DL (ref 0.5–1.03)
EGFRCR SERPLBLD CKD-EPI 2021: 84 ML/MIN/1.73M2
EOSINOPHIL # BLD AUTO: 171 CELLS/UL (ref 15–500)
EOSINOPHIL NFR BLD AUTO: 2.9 %
ERYTHROCYTE [DISTWIDTH] IN BLOOD BY AUTOMATED COUNT: 15.5 % (ref 11–15)
EST. AVERAGE GLUCOSE BLD GHB EST-MCNC: 114 MG/DL
EST. AVERAGE GLUCOSE BLD GHB EST-SCNC: 6.3 MMOL/L
GLUCOSE SERPL-MCNC: 96 MG/DL (ref 65–99)
HBA1C MFR BLD: 5.6 %
HCT VFR BLD AUTO: 38.6 % (ref 35–45)
HDLC SERPL-MCNC: 56 MG/DL
HGB BLD-MCNC: 11.7 G/DL (ref 11.7–15.5)
LDLC SERPL CALC-MCNC: 95 MG/DL (CALC)
LYMPHOCYTES # BLD AUTO: 1788 CELLS/UL (ref 850–3900)
LYMPHOCYTES NFR BLD AUTO: 30.3 %
MCH RBC QN AUTO: 25.2 PG (ref 27–33)
MCHC RBC AUTO-ENTMCNC: 30.3 G/DL (ref 32–36)
MCV RBC AUTO: 83.2 FL (ref 80–100)
MONOCYTES # BLD AUTO: 342 CELLS/UL (ref 200–950)
MONOCYTES NFR BLD AUTO: 5.8 %
NEUTROPHILS # BLD AUTO: 3558 CELLS/UL (ref 1500–7800)
NEUTROPHILS NFR BLD AUTO: 60.3 %
NONHDLC SERPL-MCNC: 110 MG/DL (CALC)
PLATELET # BLD AUTO: 218 THOUSAND/UL (ref 140–400)
PMV BLD REES-ECKER: 10.3 FL (ref 7.5–12.5)
POTASSIUM SERPL-SCNC: 4.3 MMOL/L (ref 3.5–5.3)
PROT SERPL-MCNC: 6.7 G/DL (ref 6.1–8.1)
RBC # BLD AUTO: 4.64 MILLION/UL (ref 3.8–5.1)
SODIUM SERPL-SCNC: 142 MMOL/L (ref 135–146)
TRIGL SERPL-MCNC: 60 MG/DL
TSH SERPL-ACNC: 1.71 MIU/L
WBC # BLD AUTO: 5.9 THOUSAND/UL (ref 3.8–10.8)

## 2025-07-08 ENCOUNTER — TELEPHONE (OUTPATIENT)
Dept: PRIMARY CARE | Facility: CLINIC | Age: 55
End: 2025-07-08
Payer: COMMERCIAL

## 2025-07-08 NOTE — TELEPHONE ENCOUNTER
Called patient and left a voicemail and informed her that all her labs were normal and there were no concerns.

## 2025-07-18 NOTE — PROGRESS NOTES
Clinical Pharmacy Appointment    Patient ID: Glory Winters is a 54 y.o. female who presents for Weight Loss.    Pt is here for First appointment.     Referring Provider: Noé Garcia APRN-*  PCP: MARIA DEL CARMEN Mcneil-CNP  Last visit with PCP: 7/1/2025   Next visit with PCP: 12/15/2025    Subjective   Medication Reconciliation:  Changed: N/A  Added: N/A  Discontinued: N/A    Drug Interactions  No relevant drug interactions were noted.    Medication System Management  Patient's preferred pharmacy: [facility]  Adherence/Organization: no issues reported  Affordability/Accessibility: no issues reported      Interval History  Patient interested in discussing weight loss medications  Patient diagnosed with type 2 diabetes mellitus ~18 years ago when weight was ~400 lbs, however has since been well-controlled     HPI  WEIGHT LOSS  BMI Readings from Last 3 Encounters:   07/01/25 34.48 kg/m²   06/18/24 32.69 kg/m²   02/20/24 34.41 kg/m²      Starting weight: 227 lbs. (7/1/25)  Current weight: 227 lbs.    Weight Goals  Maintenance BMI Goal: 23 to 29.9 reasonable due to age  Maintenance Weight Goal: 150 lbs.    Lifestyle  Diet: 1 meals/day. However, snacks throughout the day  BK: cup of coffee  LN: does not eat a typical lunch, however snacks  DN: meat + vegetable + carb   Snacks: beef sticks, cheese sticks, pork rinds, strawberries, chocolate, protein bar  Drinks: water (aims for 100 oz/day with crystal lite), coffee, diet Pepsi  Has worked with nutritionist/dietician? No  Has worked with weight management? No  Exercise:   Does not exercise  However, is active getting up and around while at work   Is limited by: shoulder pain    Other Potential Contributing Factors  Alcohol use: Average 1 drink/month  Tobacco use: No  Other drug use: No  Medications that may cause weight gain: none  Mental health: No current concerns  Eating Disorders: Denies Hx of any eating disorder  Comorbidities: Asthma, Depressed Mood,  Esophageal Reflux, and Hypertension    Pertinent PMH Review:  PMH of Pancreatitis: No  PMH of Retinopathy: No  PMH of MTC: No  PMH of MEN2: No    Non-Pharmacological Therapy  Weight loss techniques attempted:  Self-directed dieting: keto diet- lost 80 lbs in 8 - 10 months however gained it back after stopping diet, lost 40 lbs in 12 weeks during a second round     Pharmacological Therapy  Current Medications: none  Clarifications to above regimen: none  Adverse Effects: N/A  Previous Medications: Adipex- lost 10 - 15 lbs     Insurance coverage of weight-loss medications? No, however will submit prior authorization  Eligible for copay cards/programs? Yes, commercial insurance      Objective   Allergies[1]  Social History     Social History Narrative    Not on file      Medication Review  Current Outpatient Medications   Medication Instructions    clotrimazole-betamethasone (Lotrisone) cream 1 Application, Topical, 2 times daily    pantoprazole (PROTONIX) 40 mg, oral, Daily      Vitals  BP Readings from Last 2 Encounters:   07/01/25 120/68   06/18/24 122/78     BMI Readings from Last 1 Encounters:   07/01/25 34.48 kg/m²      Labs  A1C  Lab Results   Component Value Date    HGBA1C 5.6 07/05/2025    HGBA1C 4.9 02/21/2024    HGBA1C 5.0 02/01/2022     BMP  Lab Results   Component Value Date    CALCIUM 9.1 07/05/2025     07/05/2025    K 4.3 07/05/2025    CO2 26 07/05/2025     07/05/2025    BUN 19 07/05/2025    CREATININE 0.83 07/05/2025    EGFR 84 07/05/2025     LFTs  Lab Results   Component Value Date    ALT 10 07/05/2025    AST 13 07/05/2025    ALKPHOS 50 07/05/2025    BILITOT 0.5 07/05/2025     FLP  Lab Results   Component Value Date    TRIG 60 07/05/2025    CHOL 166 07/05/2025    LDLF 109 (H) 12/29/2020    LDLCALC 95 07/05/2025    HDL 56 07/05/2025     Urine Microalbumin  Lab Results   Component Value Date    MICROALBCREA 4.0 01/17/2020     Weight Management  Wt Readings from Last 3 Encounters:   07/01/25  103 kg (226 lb 12.8 oz)   06/18/24 97.5 kg (215 lb)   02/20/24 101 kg (223 lb)      There is no height or weight on file to calculate BMI.     Assessment/Plan   Problem List Items Addressed This Visit       Obesity - Primary    Current regimen includes no medications for weight loss. Patient's diet typically consists of one main meal with snacks throughout the day. Patient does not exercise, however is is active with walking around at work. Patient's current weight reported as 227 lbs. Has lost 0 lbs (0% of TBW) as patient has not yet started therapy plan. Patient was diagnosed with type 2 diabetes mellitus ~18 years ago. A1c is now well-controlled without medication. In the past, patient has tried the keto diet and lost 80 lbs, however gained the weight back after stopping the diet. She has also previously tried phentermine and lost 10- 15 lbs. Patient would benefit from starting on a GLP-1 medication to help with weight loss, hypertension, and keeping A1c at goal. Will submit a prior authorization to insurance. Follow-up in 1 week.    Medication Changes:  CONTINUE  All medications under continuation of care with primary care provider    Future Considerations:  Consider starting a GLP-1 medication if prior authorization approved    Monitoring and Education:  Counseled patient on relevant medication mechanisms of action, expectations, side effects, duration of therapy, contraindications, administration, and monitoring parameters.  All questions and concerns addressed. Contact pharmacist with any further questions or concerns prior to next appointment.  Reviewed dietary recommendations: Healthy Plate method           Relevant Orders    Referral to Clinical Pharmacy     Clinical Pharmacist follow-up: 7/28/25 @ 3:40 PM, Telehealth visit  Patient is not followed in [facility].     Continue all meds under the continuation of care with the referring provider and clinical pharmacy team.    Thank you,  Krys Gross,  PharmD  Clinical Pharmacist  [phone]    Verbal consent to manage patient's drug therapy was obtained from the patient. They were informed they may decline to participate or withdraw from participation in pharmacy services at any time.         [1]   Allergies  Allergen Reactions    Estradiol-Progesterone Hives    Estrogenic Substance Hives    Losartan Other

## 2025-07-21 ENCOUNTER — APPOINTMENT (OUTPATIENT)
Dept: PHARMACY | Facility: HOSPITAL | Age: 55
End: 2025-07-21
Payer: COMMERCIAL

## 2025-07-21 DIAGNOSIS — E66.811 CLASS 1 OBESITY DUE TO EXCESS CALORIES WITHOUT SERIOUS COMORBIDITY WITH BODY MASS INDEX (BMI) OF 34.0 TO 34.9 IN ADULT: Primary | ICD-10-CM

## 2025-07-21 DIAGNOSIS — E66.09 CLASS 1 OBESITY DUE TO EXCESS CALORIES WITHOUT SERIOUS COMORBIDITY WITH BODY MASS INDEX (BMI) OF 34.0 TO 34.9 IN ADULT: Primary | ICD-10-CM

## 2025-07-21 NOTE — ASSESSMENT & PLAN NOTE
Current regimen includes no medications for weight loss. Patient's diet typically consists of one main meal with snacks throughout the day. Patient does not exercise, however is is active with walking around at work. Patient's current weight reported as 227 lbs. Has lost 0 lbs (0% of TBW) as patient has not yet started therapy plan. Patient was diagnosed with type 2 diabetes mellitus ~18 years ago. A1c is now well-controlled without medication. In the past, patient has tried the keto diet and lost 80 lbs, however gained the weight back after stopping the diet. She has also previously tried phentermine and lost 10- 15 lbs. Patient would benefit from starting on a GLP-1 medication to help with weight loss, hypertension, and keeping A1c at goal. Will submit a prior authorization to insurance. Follow-up in 1 week.    Medication Changes:  CONTINUE  All medications under continuation of care with primary care provider    Future Considerations:  Consider starting a GLP-1 medication if prior authorization approved    Monitoring and Education:  Counseled patient on relevant medication mechanisms of action, expectations, side effects, duration of therapy, contraindications, administration, and monitoring parameters.  All questions and concerns addressed. Contact pharmacist with any further questions or concerns prior to next appointment.  Reviewed dietary recommendations: Healthy Plate method

## 2025-07-28 ENCOUNTER — APPOINTMENT (OUTPATIENT)
Dept: PHARMACY | Facility: HOSPITAL | Age: 55
End: 2025-07-28
Payer: COMMERCIAL

## 2025-07-28 ENCOUNTER — HOSPITAL ENCOUNTER (OUTPATIENT)
Dept: RADIOLOGY | Facility: HOSPITAL | Age: 55
Discharge: HOME | End: 2025-07-28
Payer: COMMERCIAL

## 2025-07-28 VITALS — BODY MASS INDEX: 34.56 KG/M2 | WEIGHT: 228 LBS | HEIGHT: 68 IN

## 2025-07-28 DIAGNOSIS — Z12.31 ENCOUNTER FOR SCREENING MAMMOGRAM FOR MALIGNANT NEOPLASM OF BREAST: ICD-10-CM

## 2025-07-28 PROCEDURE — 77067 SCR MAMMO BI INCL CAD: CPT | Performed by: RADIOLOGY

## 2025-07-28 PROCEDURE — 77067 SCR MAMMO BI INCL CAD: CPT

## 2025-07-28 PROCEDURE — 77063 BREAST TOMOSYNTHESIS BI: CPT | Performed by: RADIOLOGY

## 2025-08-15 DIAGNOSIS — E66.09 CLASS 1 OBESITY DUE TO EXCESS CALORIES WITHOUT SERIOUS COMORBIDITY WITH BODY MASS INDEX (BMI) OF 34.0 TO 34.9 IN ADULT: Primary | ICD-10-CM

## 2025-08-15 DIAGNOSIS — E66.811 CLASS 1 OBESITY DUE TO EXCESS CALORIES WITHOUT SERIOUS COMORBIDITY WITH BODY MASS INDEX (BMI) OF 34.0 TO 34.9 IN ADULT: Primary | ICD-10-CM

## 2025-08-19 ENCOUNTER — APPOINTMENT (OUTPATIENT)
Dept: PHARMACY | Facility: HOSPITAL | Age: 55
End: 2025-08-19
Payer: COMMERCIAL

## 2025-08-19 DIAGNOSIS — E66.811 CLASS 1 OBESITY DUE TO EXCESS CALORIES WITHOUT SERIOUS COMORBIDITY WITH BODY MASS INDEX (BMI) OF 34.0 TO 34.9 IN ADULT: ICD-10-CM

## 2025-08-19 DIAGNOSIS — E66.09 CLASS 1 OBESITY DUE TO EXCESS CALORIES WITHOUT SERIOUS COMORBIDITY WITH BODY MASS INDEX (BMI) OF 34.0 TO 34.9 IN ADULT: ICD-10-CM

## 2025-12-15 ENCOUNTER — APPOINTMENT (OUTPATIENT)
Dept: PRIMARY CARE | Facility: CLINIC | Age: 55
End: 2025-12-15
Payer: COMMERCIAL